# Patient Record
Sex: FEMALE | Race: WHITE | HISPANIC OR LATINO | Employment: UNEMPLOYED | URBAN - METROPOLITAN AREA
[De-identification: names, ages, dates, MRNs, and addresses within clinical notes are randomized per-mention and may not be internally consistent; named-entity substitution may affect disease eponyms.]

---

## 2024-02-07 ENCOUNTER — HOSPITAL ENCOUNTER (EMERGENCY)
Facility: OTHER | Age: 29
Discharge: HOME | End: 2024-02-07
Attending: EMERGENCY MEDICINE

## 2024-02-07 VITALS
SYSTOLIC BLOOD PRESSURE: 114 MMHG | TEMPERATURE: 98 F | OXYGEN SATURATION: 98 % | HEIGHT: 65 IN | RESPIRATION RATE: 18 BRPM | DIASTOLIC BLOOD PRESSURE: 65 MMHG | HEART RATE: 75 BPM | BODY MASS INDEX: 23.52 KG/M2 | WEIGHT: 141.13 LBS

## 2024-02-07 DIAGNOSIS — R10.9 ABDOMINAL PAIN DURING INTRAUTERINE PREGNANCY: Primary | ICD-10-CM

## 2024-02-07 DIAGNOSIS — R07.9 CHEST PAIN: ICD-10-CM

## 2024-02-07 DIAGNOSIS — O26.899 ABDOMINAL PAIN DURING INTRAUTERINE PREGNANCY: Primary | ICD-10-CM

## 2024-02-07 DIAGNOSIS — O41.8X10 SUBCHORIONIC HEMORRHAGE OF PLACENTA IN FIRST TRIMESTER, SINGLE OR UNSPECIFIED FETUS: ICD-10-CM

## 2024-02-07 DIAGNOSIS — O46.8X1 SUBCHORIONIC HEMORRHAGE OF PLACENTA IN FIRST TRIMESTER, SINGLE OR UNSPECIFIED FETUS: ICD-10-CM

## 2024-02-07 DIAGNOSIS — J06.9 VIRAL URI WITH COUGH: ICD-10-CM

## 2024-02-07 LAB
ABO + RH BLD: NORMAL
ALBUMIN SERPL BCP-MCNC: 3.9 G/DL (ref 3.5–5.2)
ALP SERPL-CCNC: 48 U/L (ref 55–135)
ALT SERPL W/O P-5'-P-CCNC: 11 U/L (ref 10–44)
ANION GAP SERPL CALC-SCNC: 8 MMOL/L (ref 8–16)
AST SERPL-CCNC: 14 U/L (ref 10–40)
B-HCG UR QL: POSITIVE
BASOPHILS # BLD AUTO: 0.04 K/UL (ref 0–0.2)
BASOPHILS NFR BLD: 0.5 % (ref 0–1.9)
BILIRUB SERPL-MCNC: 0.5 MG/DL (ref 0.1–1)
BILIRUB UR QL STRIP: NEGATIVE
BUN SERPL-MCNC: 7 MG/DL (ref 6–20)
CALCIUM SERPL-MCNC: 8.7 MG/DL (ref 8.7–10.5)
CHLORIDE SERPL-SCNC: 105 MMOL/L (ref 95–110)
CLARITY UR: CLEAR
CO2 SERPL-SCNC: 21 MMOL/L (ref 23–29)
COLOR UR: YELLOW
CREAT SERPL-MCNC: 0.6 MG/DL (ref 0.5–1.4)
CTP QC/QA: YES
DIFFERENTIAL METHOD BLD: NORMAL
EOSINOPHIL # BLD AUTO: 0.2 K/UL (ref 0–0.5)
EOSINOPHIL NFR BLD: 2.5 % (ref 0–8)
ERYTHROCYTE [DISTWIDTH] IN BLOOD BY AUTOMATED COUNT: 13.2 % (ref 11.5–14.5)
EST. GFR  (NO RACE VARIABLE): >60 ML/MIN/1.73 M^2
GLUCOSE SERPL-MCNC: 91 MG/DL (ref 70–110)
GLUCOSE UR QL STRIP: NEGATIVE
HCG INTACT+B SERPL-ACNC: NORMAL MIU/ML
HCT VFR BLD AUTO: 40.4 % (ref 37–48.5)
HGB BLD-MCNC: 13.7 G/DL (ref 12–16)
HGB UR QL STRIP: NEGATIVE
IMM GRANULOCYTES # BLD AUTO: 0.03 K/UL (ref 0–0.04)
IMM GRANULOCYTES NFR BLD AUTO: 0.4 % (ref 0–0.5)
KETONES UR QL STRIP: ABNORMAL
LEUKOCYTE ESTERASE UR QL STRIP: NEGATIVE
LIPASE SERPL-CCNC: 19 U/L (ref 4–60)
LYMPHOCYTES # BLD AUTO: 1.4 K/UL (ref 1–4.8)
LYMPHOCYTES NFR BLD: 19.1 % (ref 18–48)
MCH RBC QN AUTO: 30.7 PG (ref 27–31)
MCHC RBC AUTO-ENTMCNC: 33.9 G/DL (ref 32–36)
MCV RBC AUTO: 91 FL (ref 82–98)
MONOCYTES # BLD AUTO: 0.6 K/UL (ref 0.3–1)
MONOCYTES NFR BLD: 8.2 % (ref 4–15)
NEUTROPHILS # BLD AUTO: 5.1 K/UL (ref 1.8–7.7)
NEUTROPHILS NFR BLD: 69.3 % (ref 38–73)
NITRITE UR QL STRIP: NEGATIVE
NRBC BLD-RTO: 0 /100 WBC
PH UR STRIP: 8 [PH] (ref 5–8)
PLATELET # BLD AUTO: 241 K/UL (ref 150–450)
PMV BLD AUTO: 9.2 FL (ref 9.2–12.9)
POC MOLECULAR INFLUENZA A AGN: NEGATIVE
POC MOLECULAR INFLUENZA B AGN: NEGATIVE
POTASSIUM SERPL-SCNC: 3.8 MMOL/L (ref 3.5–5.1)
PROT SERPL-MCNC: 7.2 G/DL (ref 6–8.4)
PROT UR QL STRIP: NEGATIVE
RBC # BLD AUTO: 4.46 M/UL (ref 4–5.4)
SARS-COV-2 RDRP RESP QL NAA+PROBE: NEGATIVE
SODIUM SERPL-SCNC: 134 MMOL/L (ref 136–145)
SP GR UR STRIP: 1.02 (ref 1–1.03)
TROPONIN I SERPL DL<=0.01 NG/ML-MCNC: <0.006 NG/ML (ref 0–0.03)
URN SPEC COLLECT METH UR: ABNORMAL
UROBILINOGEN UR STRIP-ACNC: NEGATIVE EU/DL
WBC # BLD AUTO: 7.34 K/UL (ref 3.9–12.7)

## 2024-02-07 PROCEDURE — 86901 BLOOD TYPING SEROLOGIC RH(D): CPT

## 2024-02-07 PROCEDURE — 99285 EMERGENCY DEPT VISIT HI MDM: CPT | Mod: 25

## 2024-02-07 PROCEDURE — 81003 URINALYSIS AUTO W/O SCOPE: CPT

## 2024-02-07 PROCEDURE — 83690 ASSAY OF LIPASE: CPT

## 2024-02-07 PROCEDURE — 84484 ASSAY OF TROPONIN QUANT: CPT

## 2024-02-07 PROCEDURE — 81025 URINE PREGNANCY TEST: CPT

## 2024-02-07 PROCEDURE — 25000003 PHARM REV CODE 250

## 2024-02-07 PROCEDURE — 96361 HYDRATE IV INFUSION ADD-ON: CPT

## 2024-02-07 PROCEDURE — 80053 COMPREHEN METABOLIC PANEL: CPT

## 2024-02-07 PROCEDURE — 84702 CHORIONIC GONADOTROPIN TEST: CPT

## 2024-02-07 PROCEDURE — 96360 HYDRATION IV INFUSION INIT: CPT

## 2024-02-07 PROCEDURE — 85025 COMPLETE CBC W/AUTO DIFF WBC: CPT

## 2024-02-07 PROCEDURE — 87635 SARS-COV-2 COVID-19 AMP PRB: CPT

## 2024-02-07 RX ORDER — ACETAMINOPHEN 500 MG
500 TABLET ORAL
Status: COMPLETED | OUTPATIENT
Start: 2024-02-07 | End: 2024-02-07

## 2024-02-07 RX ADMIN — SODIUM CHLORIDE 1000 ML: 900 INJECTION, SOLUTION INTRAVENOUS at 05:02

## 2024-02-07 RX ADMIN — ACETAMINOPHEN 500 MG: 500 TABLET ORAL at 05:02

## 2024-02-07 NOTE — ED TRIAGE NOTES
Pt arrived to ED with c/o lower abdominal pain, headache and chills for 2 days. Pt reports being 1 month pregnant, does not have an OB yet. Denies vaginal bleeding.

## 2024-02-07 NOTE — ED PROVIDER NOTES
Source of History:  Patient    Chief complaint:  Abdominal Pain (Pt. C/o of lower abdominal pain x 2 days. Pt. Is 1 month pregnant. Pt. Also c/o of a headache and chills x 3 days.)      HPI:  Tamela Andersen is a 28 y.o. female presenting with c/o abdominal pain x3 days.  Patient reports that she is approximately 1 month pregnant.  She states that 3 days ago, she began having a sharp pain to her lower abdomen that she describes as feeling similar to contractions.  She states that the pain radiates around to her lower back.  She reports that the pain is intermittent, currently rates it a 3/10.  She reports associated nausea and vomiting.  Last episode of vomiting was yesterday.  She reports that she has been sick with cough, congestion, sore throat for the past week.  She reports associated headache and chills.  She also reports subjective fever.   She states she feels her symptoms worsened 3 days ago with the abdominal cramping.  She also reports that she has been having a chest pain that she describes as a pressure that is intermittent and lasts a few minutes at a time.  She denies any current chest pain.  She denies any shortness of breath.  She denies any vaginal bleeding.  Does report a white vaginal discharge that began 3 days ago.  Reports her last menstrual cycle was December 10th.  States that she had a positive pregnancy test last week.  Has not had initial OB gyn appointment yet.  She reports this is her 2nd pregnancy, she has 1 daughter who is 10 years old. She reports she [place] and does not yet have insurance or established with a PCP. She denies any past medical history.     Jessenia  used to obtain history.     This is the extent to the patients complaints today here in the emergency department.    PMH:  As per HPI and below:  No past medical history on file.  No past surgical history on file.       Review of patient's allergies indicates:  No Known  "Allergies    ROS: As per HPI and below:  Constitutional:  Positive for subjective fever, chills  ENT:  Positive for sore throat, congestion.  Cardiovascular:  Positive for chest pain.  Respiratory: No shortness of breath.  GI:  Positive for abdominal pain, nausea, vomiting.  No diarrhea.  Genitourinary:  Positive for vaginal discharge.  No urinary symptoms.  No vaginal bleeding.  Neurologic:  Positive for headache.    Integument: No rashes or lesions.    Physical Exam:    /65 (BP Location: Right arm, Patient Position: Sitting)   Pulse 75   Temp 98.1 °F (36.7 °C) (Oral)   Resp 18   Ht 5' 4.96" (1.65 m)   Wt 64 kg (141 lb 1.5 oz)   SpO2 98%   BMI 23.51 kg/m²   Vitals:    02/07/24 1642 02/07/24 1927   BP: (!) 94/58 114/65   Pulse: 73 75   Resp: 20 18   Temp: 98.1 °F (36.7 °C)    TempSrc: Oral    SpO2: 99% 98%   Weight: 64 kg (141 lb 1.5 oz)    Height: 5' 4.96" (1.65 m)        Nurse's notes vitals reviewed  Constitutional: Patient alert and oriented well-developed well-nourished. Well appearing, speaking in full sentences.   Eyes:  Normal conjunctiva.  Extraocular muscles are intact.  ENT: Oral mucosa moist.  Oropharynx clear.  Throat with no significant erythema, no tonsillar swelling, erythema, exudates.  Uvula midline.  No cervical adenopathy.  No meningismus.  Chest:  Lungs clear to auscultation bilaterally.  No respiratory distress.  No accessory muscle use.  No wheezes, rales, rhonchi.  Cardiovascular:  Heart regular rate and rhythm, no murmurs, no rubs, no gallops.  GI:  Mild suprapubic tenderness to palpation, no significant guarding.  No other tenderness to palpation.  No masses, no rebound.  :  Patient declined.  Musculoskeletal: Normocephalic atraumatic, normal range of motion, no obvious deformities  Skin: Warm and dry no rashes or lesions, no ecchymosis, no erythema  Neuro: alert and oriented x3,  no focal neurological deficits.  Psych: Appropriate, conversant      Labs Reviewed "   COMPREHENSIVE METABOLIC PANEL - Abnormal; Notable for the following components:       Result Value    Sodium 134 (*)     CO2 21 (*)     Alkaline Phosphatase 48 (*)     All other components within normal limits    Narrative:     Release to patient->Immediate   URINALYSIS, REFLEX TO URINE CULTURE - Abnormal; Notable for the following components:    Ketones, UA 1+ (*)     All other components within normal limits    Narrative:     Specimen Source->Urine   POCT URINE PREGNANCY - Abnormal; Notable for the following components:    POC Preg Test, Ur Positive (*)     All other components within normal limits   CBC W/ AUTO DIFFERENTIAL    Narrative:     Release to patient->Immediate   HCG, QUANTITATIVE    Narrative:     Release to patient->Immediate   LIPASE    Narrative:     Release to patient->Immediate   TROPONIN I    Narrative:     Release to patient->Immediate   SARS-COV-2 RDRP GENE   POCT INFLUENZA A/B MOLECULAR   GROUP & RH       US OB <14 Wks, TransAbd, Single Gestation   Final Result      Single living intrauterine gestation, crown-rump length correlates with an estimated gestational age of 7 weeks 5 days, cardiac activity documented at 150 beats per minute.  There is an adjacent subchorionic hemorrhage, follow-up is advised.         Electronically signed by: Charli Diggs MD   Date:    2024   Time:    18:44            MDM:    Medical Decision Making  28-year-old  female with no known past medical history presenting for evaluation of lower abdominal pain in the setting of positive pregnancy test along with upper respiratory symptoms.  Patient is afebrile and hemodynamically stable.  Exam findings as described above.  Patient reports that she has had a cold over the past few days, but had worsening abdominal pain, nausea, chills x3 days.  Reports she is concerned because she had a positive pregnancy test and wants to check on the baby given the abdominal pain. No vaginal bleeding but does report  associated vaginal discharge that is white in color.  Has not had initial ultrasound appointment yet.  Has not taken anything for pain or for symptoms.  Does report some chest pain that she describes as a pressure that is intermittent, not related to exertion.  Plan for labs, cardiac workup, chest x-ray, ultrasound, we will reassess.    Initial Impression/ Differential Dx:  Initial differentials include viral URI, pneumonia, ACS, PE, pregnancy complication (threatened AB, inevitable AB, incomplete Ab, missed AB, uterine rupture, ectopic pregnancy, placental abruption, placenta previa), ovarian cyst/torsion, UTI/pyelonephritis    Initial workup reassuring.  Flu and COVID negative.  CMP largely unremarkable.  CBC largely unremarkable.  Lipase negative.  UA with no evidence of infection.  Ultrasound reveals IUP with gestational age of 7 weeks 5 days with a heart rate of 150 beats per minute.  Does note adjacent subchorionic hemorrhage.  On my re-evaluation to perform pelvic exam after ultrasound, patient is fully dressed and requesting discharge.  She declined any further workup or exam.  She stated that she just wanted to check on the baby and did not want any further workup.  I had a discussion with patient about the risks and benefits of leaving the hospital prior to workup being completed given we had not yet obtained EKG, chest x-ray, vaginal swabs to complete workup for chest pain, vaginal discharge.  Patient verbalized understanding but continued to decline any further workup, states that her pain has improved with Tylenol and she is feeling overall well.  Patient is young, healthy, overall well-appearing, have lower suspicion for ACS, no significant shortness of breath and no hypoxia, do not suspect PE.  Recommended patient stay for remainder of workup, however she continued to decline and has capacity to do so.  Patient likely has viral URI with abdominal cramping secondary to pregnancy.  Reassuring ultrasound  today.  Patient educated on diagnosis of subchorionic hemorrhage, advised pelvic rest and OB follow up.  Strict ER return precautions for vaginal bleeding or severe abdominal pain given.  [place], I discussed patient with OB gyn providers to aid her in obtaining follow up for her pregnancy.  Had a discussion with patient about treatment of URI in pregnancy.  She verbalized understanding of discharge instructions and was in agreement.  I advised patient that she should return to the ER for any new or worsening symptoms or if she changed her mind about obtaining further workup.  She verbalized understanding. Patient is stable for discharge with outpatient follow up. I discussed this case with my attending provider, Dr. Iniguez, who was in agreement with plan.     Amount and/or Complexity of Data Reviewed  Labs: ordered.    Risk  OTC drugs.        ED Course as of 02/08/24 0012 Wed Feb 07, 2024   1754 hCG Qualitative, Urine(!): Positive [SW]   1804 POC Molecular Influenza A Ag: Negative [SW]   1804 POC Molecular Influenza B Ag: Negative [SW]   1804 SARS-CoV-2 RNA, Amplification, Qual: Negative [SW]   1830 Sodium(!): 134  Fluids being given. [SW]   1830 Potassium: 3.8 [SW]   1830 CO2(!): 21 [SW]   1830 Creatinine: 0.6 [SW]   1830 CMP largely unremarkable, normal renal function, no elevation in liver enzymes. [SW]   1830 CBC with no leukocytosis, no anemia. [SW]   1830 Lipase: 19 [SW]   1830 Troponin I: <0.006 [SW]   1921 Ketones, UA(!): 1+ [SW]   1922 NITRITE UA: Negative [SW]   1922 Leukocyte Esterase, UA: Negative [SW]      ED Course User Index  [SW] Beverley Parker PA-C       Diagnostic Impression:    1. Abdominal pain during intrauterine pregnancy    2. Chest pain    3. Viral URI with cough    4. Subchorionic hemorrhage of placenta in first trimester, single or unspecified fetus         ED Disposition Condition    Discharge Stable            ED Prescriptions    None        Follow-up Information       Follow up With Specialties Details Why Contact Info    [facility]  Schedule an appointment as soon as possible for a visit   [address] [facility] [address] [phone]      [facility] Schedule an appointment as soon as possible for a visit   [address]  [facility] [address]  [facility] [address] [phone]    [facility] Emergency Dept Emergency Medicine Go to  If symptoms worsen [address]  [facility] [id number] [phone]             Beverley Parker PA-C  02/08/24 0017

## 2024-02-08 ENCOUNTER — TELEPHONE (OUTPATIENT)
Dept: OBSTETRICS AND GYNECOLOGY | Facility: CLINIC | Age: 29
End: 2024-02-08

## 2024-02-08 NOTE — DISCHARGE INSTRUCTIONS
Lo atendieron en urgencias para nancy evaluación de dolor abdominal. Le hicieron nancy ecografía que mostró un embarazo intrauterino de 7 semanas y 5 días. Se observó nancy hemorragia subcoriónica en la ecografía, que puede causar algo de sangrado vaginal. Recomiendo no introducir nada en la vagina hasta que obtenga un seguimiento con rankin obstetra, lo que incluye evitar tampones y tener relaciones sexuales. Le proporciono dos clínicas arriba que puede comunicarse con un obstetra para establecerlas. Para el resfriado, recomiendo mckenzie Tylenol de venta fabian según sea necesario para el dolor de gael, la fiebre y el dolor. Puede usar miel o guaifenesina de venta fabian para la tos. Para la congestión, puede usar un aerosol salino nasal de venta fabian. Tammy un seguimiento con un proveedor de obstetricia dentro de 1 semana. Regrese a la cesar de emergencias inmediatamente si comenzó a experimentar sangrado vaginal abundante, dolor abdominal intenso o aturdimiento/síncope    You were seen in the ER for evaluation of abdominal pain.  You had an ultrasound that showed a 7 week 5 day intrauterine pregnancy.  There was a subchorionic hemorrhage noted on your ultrasound, which may cause some vaginal bleeding.  I recommend not putting anything in the vagina until you obtain follow up with your OB provider, which includes avoiding tampons and sexual intercourse.  I am providing you with two clinics above that you may reach out to establish with an OB. For your cold, I recommend taking over-the-counter Tylenol as needed for headache, fever, and pain. You may use honey or take over the counter guaifenesin for cough. For congestion, you may use over the counter nasal saline spray.  Please follow up with an OB provider within 1 week.  Return to the ER immediately if you began experiencing heavy vaginal bleeding, severe abdominal pain, or lightheadedness/syncope.

## 2024-02-08 NOTE — TELEPHONE ENCOUNTER
----- Message from Malissa Castaneda MD sent at 2/7/2024  6:57 PM CST -----  Hi-     This patient is newly pregnant and does not have an OB. Would we be able to set her up with visit?     Thank you,    Malissa Castaneda MD